# Patient Record
Sex: MALE | URBAN - METROPOLITAN AREA
[De-identification: names, ages, dates, MRNs, and addresses within clinical notes are randomized per-mention and may not be internally consistent; named-entity substitution may affect disease eponyms.]

---

## 2020-08-06 ENCOUNTER — APPOINTMENT (RX ONLY)
Dept: URBAN - METROPOLITAN AREA CLINIC 174 | Facility: CLINIC | Age: 50
Setting detail: DERMATOLOGY
End: 2020-08-06

## 2020-08-06 DIAGNOSIS — D18.0 HEMANGIOMA: ICD-10-CM

## 2020-08-06 DIAGNOSIS — Z71.89 OTHER SPECIFIED COUNSELING: ICD-10-CM

## 2020-08-06 DIAGNOSIS — B35.4 TINEA CORPORIS: ICD-10-CM

## 2020-08-06 DIAGNOSIS — D22 MELANOCYTIC NEVI: ICD-10-CM

## 2020-08-06 DIAGNOSIS — L81.4 OTHER MELANIN HYPERPIGMENTATION: ICD-10-CM

## 2020-08-06 DIAGNOSIS — L91.8 OTHER HYPERTROPHIC DISORDERS OF THE SKIN: ICD-10-CM

## 2020-08-06 DIAGNOSIS — L82.0 INFLAMED SEBORRHEIC KERATOSIS: ICD-10-CM

## 2020-08-06 DIAGNOSIS — B07.8 OTHER VIRAL WARTS: ICD-10-CM

## 2020-08-06 PROBLEM — D48.5 NEOPLASM OF UNCERTAIN BEHAVIOR OF SKIN: Status: ACTIVE | Noted: 2020-08-06

## 2020-08-06 PROBLEM — D23.72 OTHER BENIGN NEOPLASM OF SKIN OF LEFT LOWER LIMB, INCLUDING HIP: Status: ACTIVE | Noted: 2020-08-06

## 2020-08-06 PROBLEM — D18.01 HEMANGIOMA OF SKIN AND SUBCUTANEOUS TISSUE: Status: ACTIVE | Noted: 2020-08-06

## 2020-08-06 PROBLEM — D22.61 MELANOCYTIC NEVI OF RIGHT UPPER LIMB, INCLUDING SHOULDER: Status: ACTIVE | Noted: 2020-08-06

## 2020-08-06 PROCEDURE — ? FULL BODY SKIN EXAM

## 2020-08-06 PROCEDURE — ? PRESCRIPTION

## 2020-08-06 PROCEDURE — ? COUNSELING

## 2020-08-06 PROCEDURE — 99203 OFFICE O/P NEW LOW 30 MIN: CPT | Mod: 25

## 2020-08-06 PROCEDURE — 11103 TANGNTL BX SKIN EA SEP/ADDL: CPT | Mod: 59

## 2020-08-06 PROCEDURE — 11102 TANGNTL BX SKIN SINGLE LES: CPT | Mod: 59

## 2020-08-06 PROCEDURE — ? BIOPSY BY SHAVE METHOD

## 2020-08-06 PROCEDURE — ? LIQUID NITROGEN

## 2020-08-06 PROCEDURE — 17110 DESTRUCTION B9 LES UP TO 14: CPT

## 2020-08-06 RX ORDER — KETOCONAZOLE 20 MG/G
1 APPLICATION CREAM TOPICAL BID
Qty: 1 | Refills: 0 | Status: ERX | COMMUNITY
Start: 2020-08-06

## 2020-08-06 RX ADMIN — KETOCONAZOLE 1 APPLICATION: 20 CREAM TOPICAL at 00:00

## 2020-08-06 ASSESSMENT — LOCATION DETAILED DESCRIPTION DERM
LOCATION DETAILED: MID-OCCIPITAL SCALP
LOCATION DETAILED: LEFT SUPERIOR MEDIAL UPPER BACK
LOCATION DETAILED: LEFT ANTERIOR PROXIMAL THIGH
LOCATION DETAILED: LEFT CLAVICULAR NECK
LOCATION DETAILED: EPIGASTRIC SKIN
LOCATION DETAILED: LEFT KNEE
LOCATION DETAILED: LEFT ANTERIOR LATERAL DISTAL THIGH
LOCATION DETAILED: RIGHT ANTERIOR SHOULDER

## 2020-08-06 ASSESSMENT — LOCATION SIMPLE DESCRIPTION DERM
LOCATION SIMPLE: LEFT THIGH
LOCATION SIMPLE: POSTERIOR SCALP
LOCATION SIMPLE: RIGHT SHOULDER
LOCATION SIMPLE: LEFT ANTERIOR NECK
LOCATION SIMPLE: LEFT UPPER BACK
LOCATION SIMPLE: ABDOMEN
LOCATION SIMPLE: LEFT KNEE

## 2020-08-06 ASSESSMENT — LOCATION ZONE DERM
LOCATION ZONE: SCALP
LOCATION ZONE: ARM
LOCATION ZONE: TRUNK
LOCATION ZONE: NECK
LOCATION ZONE: LEG

## 2020-08-06 NOTE — PROCEDURE: BIOPSY BY SHAVE METHOD
Post-Care Instructions: I reviewed with the patient in detail post-care instructions. Patient is to keep the biopsy site covered overnight and apply Vaseline or Aquaphor once to twice daily until healed.

## 2020-08-06 NOTE — PROCEDURE: LIQUID NITROGEN
Include Z78.9 (Other Specified Conditions Influencing Health Status) As An Associated Diagnosis?: No
Duration Of Freeze Thaw-Cycle (Seconds): 5-10
Consent: The patient's consent was obtained including but not limited to risks of crusting, scabbing, blistering, scarring, darker or lighter pigmentary change, recurrence, incomplete removal and infection.
Detail Level: Zone
Post-Care Instructions: I reviewed with the patient in detail post-care instructions. Patient is to wear sunprotection, and avoid picking at any of the treated lesions. Pt may apply Vaseline to crusted or scabbing areas.
Medical Necessity Information: It is in your best interest to select a reason for this procedure from the list below. All of these items fulfill various CMS LCD requirements except the new and changing color options.
Number Of Freeze-Thaw Cycles: 2 freeze-thaw cycles
Medical Necessity Clause: This procedure was medically necessary because the lesions that were treated were:

## 2024-10-10 LAB
ALT SERPL-CCNC: 48 U/L (ref 7–52)
AST SERPL-CCNC: 23 U/L (ref 13–39)
BASOPHILS ABSOLUTE: 0 K/UL (ref 0–0.1)
BASOPHILS RELATIVE PERCENT: 0.3 %
BILIRUBIN, URINE: NEGATIVE MG/DL
BLOOD, URINE: NEGATIVE MG/DL
BUN BLDV-MCNC: 16 MG/DL (ref 7–25)
C3 COMPLEMENT: 128 MG/DL (ref 87–200)
C4 COMPLEMENT: 45 MG/DL (ref 19–52)
CALCIUM OXALATE CRYSTALS: ABNORMAL /HPF
CCP IGG ANTIBODIES: NEGATIVE U/ML
CLARITY, UA: CLEAR
COLOR, UA: YELLOW
CREAT SERPL-MCNC: 1.21 MG/DL (ref 0.7–1.3)
EGFR MALE: 72 ML/MIN/1.73M2
EOSINOPHILS ABSOLUTE: 0.1 K/UL (ref 0–0.4)
EOSINOPHILS RELATIVE PERCENT: 1.5 %
GLUCOSE URINE: 50 MG/DL
HBV SURFACE AB TITR SER: REACTIVE {TITER}
HCT VFR BLD CALC: 45.1 % (ref 39–51.5)
HEMOGLOBIN: 14.7 G/DL (ref 13.1–17.6)
HEP B S AGB SURF AG: NORMAL
HEPATITIS B CORE TOTAL ANTIBODY: NORMAL
HEPATITIS C ANTIBODY: NORMAL
KETONES, URINE: NEGATIVE MG/DL
LEUKOCYTES, UA: NEGATIVE LEU/UL
LYMPHOCYTES ABSOLUTE: 2.3 K/UL (ref 0.8–3.6)
LYMPHOCYTES RELATIVE PERCENT: 27.7 %
MCH RBC QN AUTO: 27.7 PG (ref 28.4–33.4)
MCHC RBC AUTO-ENTMCNC: 32.6 G/DL (ref 31.1–37)
MCV RBC AUTO: 85.1 FL (ref 85–99)
MONOCYTES ABSOLUTE: 0.8 K/UL (ref 0.3–0.9)
MONOCYTES RELATIVE PERCENT: 9.8 %
MUCUS, URINE: ABNORMAL /LPF
NEUTROPHILS ABSOLUTE: 5.1 K/UL (ref 2–7.3)
NEUTROPHILS RELATIVE PERCENT: 60.7 %
NITRITE, URINE: NEGATIVE
PDW BLD-RTO: 15.3 % (ref 11.7–15.2)
PH, URINE: 6.5 PH (ref 5–8)
PLATELET # BLD: 264 K/UL (ref 154–393)
PROTEIN UA: ABNORMAL MG/DL
RBC # BLD: 5.3 M/UL (ref 4.3–5.86)
RBC URINE: ABNORMAL /HPF (ref 0–3)
SPECIFIC GRAVITY UA: 1.02 (ref 1–1.03)
SQUAMOUS EPITHELIAL: ABNORMAL /HPF (ref 0–3)
TOTAL CK: 174 U/L (ref 30–223)
UROBILINOGEN, URINE: NORMAL MG/DL
VITAMIN D 25-HYDROXY: 23.7 NG/ML
WBC # BLD: 8.4 K/UL (ref 4–10.5)
WBC URINE: ABNORMAL /HPF (ref 0–3)

## 2024-10-11 LAB
C-REACTIVE PROTEIN: <5 MG/L
RHEUMATOID FACTOR: <10 IU/ML
SED RATE, AUTOMATED: 3 MM/HR (ref 0–20)

## 2024-10-12 LAB
ALBUMIN URINE ELP: 4 G/DL
ALPHA 1: 0.24 G/DL (ref 0.1–0.4)
ALPHA 2: 0.65 G/DL (ref 0.4–1)
BETA: 0.74 G/DL (ref 0.4–1)
GAMMA: 0.52 G/DL (ref 0.4–1.5)
HEPATITIS BE ANTIBODY: NON REACTIVE
HEPATITIS BE ANTIGEN: NEGATIVE
IGNF NEG CNTRL BLD: 0
INTERPRETATION, IMMUNOTYPING SERUM: ABNORMAL
INTERPRETATION: ABNORMAL
M. TUBERCULOSIS STIM IFN-G CFP10 AG SPOT COUNT: 0
M. TUBERCULOSIS STIM IFN-G ESAT-6 AG SPOT COUNT: 0
MITOGEN IGNF.SPOT COUNT BLD: >20
MYCOBACTERIUM TUBERCULOSIS STIMULATED GAMMA INTERFERON AND SPOT COUNT PANEL: NEGATIVE
PROTEIN ELECTROPHORESIS, SERUM: 6.1 G/DL (ref 6.4–8.2)

## 2024-10-14 LAB
ANTI JO-1 IGG: <0.2 AI (ref 0–0.9)
ANTI RNP AB: <0.2 AI (ref 0–0.9)
ANTI SSA: <0.2 AI (ref 0–0.9)
ANTI SSB: <0.2 AI (ref 0–0.9)
ANTI-SMITH: <0.2 AI (ref 0–0.9)
DSDNA ANTIBODY: 1 IU/ML (ref 0–9)
SCLERODERMA (SCL-70) AB: <0.2 AI (ref 0–0.9)

## 2024-10-15 LAB — REPORT: NORMAL

## 2024-10-16 LAB
HLA B27: NEGATIVE
IGG P18 AB: NORMAL
IGG P23 AB: NORMAL
IGG P28 AB: NORMAL
IGG P30 AB: NORMAL
IGG P39 AB: NORMAL
IGG P41 AB: NORMAL
IGG P45 AB: NORMAL
IGG P58 AB: NORMAL
IGG P66 AB: NORMAL
IGG P93 AB: NORMAL
IGM P23 AB: NORMAL
IGM P39 AB: NORMAL
IGM P41 AB: NORMAL
Lab: NEGATIVE
Lab: NEGATIVE

## 2024-10-24 LAB — ANTI-PM/SCL-100 AB (RDL): <20 UNITS

## 2024-10-25 LAB
REPORT: NORMAL
REPORT: NORMAL
